# Patient Record
Sex: FEMALE | HISPANIC OR LATINO | URBAN - METROPOLITAN AREA
[De-identification: names, ages, dates, MRNs, and addresses within clinical notes are randomized per-mention and may not be internally consistent; named-entity substitution may affect disease eponyms.]

---

## 2018-09-28 ENCOUNTER — OFFICE VISIT (OUTPATIENT)
Dept: URBAN - METROPOLITAN AREA CLINIC 76 | Facility: CLINIC | Age: 43
End: 2018-09-28

## 2018-09-28 DIAGNOSIS — H25.13 AGE-RELATED NUCLEAR CATARACT, BILATERAL: ICD-10-CM

## 2018-09-28 PROCEDURE — 92014 COMPRE OPH EXAM EST PT 1/>: CPT | Performed by: OPTOMETRIST

## 2018-09-28 ASSESSMENT — VISUAL ACUITY
OD: 20/20
OS: 20/20

## 2018-09-28 ASSESSMENT — KERATOMETRY
OD: 43.75
OS: 44.13

## 2018-09-28 ASSESSMENT — INTRAOCULAR PRESSURE
OD: 16
OS: 16

## 2018-09-28 NOTE — IMPRESSION/PLAN
Impression: Age-related nuclear cataract, bilateral: H25.13. OU. mild early Plan: Discussed condition. Continue to monitor without treatment at this time.

## 2018-09-28 NOTE — IMPRESSION/PLAN
Impression: Hypermetropia, bilateral: H52.03. Plan: Discussed diagnosis with patient. Dispensed new MRx today.

## 2019-08-19 ENCOUNTER — OFFICE VISIT (OUTPATIENT)
Dept: URBAN - METROPOLITAN AREA CLINIC 76 | Facility: CLINIC | Age: 44
End: 2019-08-19
Payer: COMMERCIAL

## 2019-08-19 DIAGNOSIS — H10.45 OTHER CHRONIC ALLERGIC CONJUNCTIVITIS: ICD-10-CM

## 2019-08-19 PROCEDURE — 92012 INTRM OPH EXAM EST PATIENT: CPT | Performed by: OPTOMETRIST

## 2019-08-19 ASSESSMENT — INTRAOCULAR PRESSURE
OD: 15
OS: 14

## 2019-08-19 ASSESSMENT — KERATOMETRY
OD: 44.13
OS: 43.88

## 2019-08-19 NOTE — IMPRESSION/PLAN
Impression: Other chronic allergic conjunctivitis: H10.45. OU. Instilled 1 drop Pazeo OU in office. Plan: Discussed diagnosis with patient. Recommend OTC oral antihistamine, and Ketotifen 1 drop bid ou, prn. Rub excess into lids. Recommend refrigerating drops.

## 2019-08-19 NOTE — IMPRESSION/PLAN
Impression: Amyloid pterygium of left eye: H11.012. OS. Plan: Discussed condition. Use artificial tears for comfort.

## 2020-09-25 ENCOUNTER — OFFICE VISIT (OUTPATIENT)
Dept: URBAN - METROPOLITAN AREA CLINIC 76 | Facility: CLINIC | Age: 45
End: 2020-09-25

## 2020-09-25 DIAGNOSIS — H11.012 AMYLOID PTERYGIUM OF LEFT EYE: ICD-10-CM

## 2020-09-25 PROCEDURE — 92014 COMPRE OPH EXAM EST PT 1/>: CPT | Performed by: OPTOMETRIST

## 2020-09-25 ASSESSMENT — INTRAOCULAR PRESSURE
OD: 18
OS: 18

## 2020-09-25 ASSESSMENT — VISUAL ACUITY
OS: 20/20
OD: 20/20

## 2021-05-12 NOTE — IMPRESSION/PLAN
Impression: Age-related nuclear cataract, bilateral: H25.13. OU. Plan: Discussed condition. Continue to monitor without treatment at this time. Detail Level: Detailed

## 2021-12-06 ENCOUNTER — OFFICE VISIT (OUTPATIENT)
Dept: URBAN - METROPOLITAN AREA CLINIC 76 | Facility: CLINIC | Age: 46
End: 2021-12-06

## 2021-12-06 DIAGNOSIS — H52.03 HYPERMETROPIA, BILATERAL: Primary | ICD-10-CM

## 2021-12-06 PROCEDURE — 92014 COMPRE OPH EXAM EST PT 1/>: CPT | Performed by: OPTOMETRIST

## 2021-12-06 ASSESSMENT — VISUAL ACUITY
OD: 20/20
OS: 20/20

## 2021-12-06 ASSESSMENT — INTRAOCULAR PRESSURE
OD: 17
OS: 18

## 2021-12-06 ASSESSMENT — KERATOMETRY
OD: 43.75
OS: 43.88

## 2021-12-06 NOTE — IMPRESSION/PLAN
Impression: Amyloid pterygium of left eye: H11.012. Left. 2-3 mm on k nasal Plan: Discussed. Pt requesting consult to have Pterygium removed.

## 2022-01-04 ENCOUNTER — OFFICE VISIT (OUTPATIENT)
Dept: URBAN - METROPOLITAN AREA CLINIC 76 | Facility: CLINIC | Age: 47
End: 2022-01-04
Payer: COMMERCIAL

## 2022-01-04 PROCEDURE — 99204 OFFICE O/P NEW MOD 45 MIN: CPT | Performed by: STUDENT IN AN ORGANIZED HEALTH CARE EDUCATION/TRAINING PROGRAM

## 2022-01-04 RX ORDER — NEOMYCIN SULFATE, POLYMYXIN B SULFATE AND DEXAMETHASONE 3.5; 10000; 1 MG/G; [USP'U]/G; MG/G
OINTMENT OPHTHALMIC
Qty: 1 | Refills: 0 | Status: ACTIVE
Start: 2022-01-04

## 2022-01-04 ASSESSMENT — INTRAOCULAR PRESSURE
OD: 18
OS: 18

## 2022-01-04 ASSESSMENT — KERATOMETRY
OS: 43.75
OD: 44.00

## 2022-01-04 NOTE — IMPRESSION/PLAN
Impression: Amyloid pterygium of left eye: H11.012. Plan: Discussed diagnosis and treatment options with patient. Reviewed risks/benefits/alternatives to surgical removal including possible change in refraction, risk of recurrence, mitomycin C, and corneal scarring/risk of corneal melt. Patient wishes to proceed with surgery. Schedule pterygium removal OS.

## 2022-12-23 ENCOUNTER — OFFICE VISIT (OUTPATIENT)
Dept: URBAN - METROPOLITAN AREA CLINIC 76 | Facility: CLINIC | Age: 47
End: 2022-12-23

## 2022-12-23 DIAGNOSIS — H52.03 HYPERMETROPIA, BILATERAL: ICD-10-CM

## 2022-12-23 DIAGNOSIS — H11.012 AMYLOID PTERYGIUM OF LEFT EYE: Primary | ICD-10-CM

## 2022-12-23 PROCEDURE — 92012 INTRM OPH EXAM EST PATIENT: CPT | Performed by: OPTOMETRIST

## 2022-12-23 ASSESSMENT — KERATOMETRY
OS: 43.88
OD: 44.00

## 2022-12-23 ASSESSMENT — INTRAOCULAR PRESSURE
OS: 16
OD: 16

## 2022-12-23 ASSESSMENT — VISUAL ACUITY
OS: 20/20
OD: 20/20

## 2022-12-23 NOTE — IMPRESSION/PLAN
Impression: Amyloid pterygium of left eye: H11.012. Plan: Small pterygium accounts for the patients symptoms. Discussed diagnosis with patient. Systane Balance/Complete or Refresh Relieva 1 drop OU BID-QID. Emphasize importance of UV protection and lubrication. Patient elects to continue without surgery at this time.

## 2023-05-22 ENCOUNTER — OFFICE VISIT (OUTPATIENT)
Dept: URBAN - METROPOLITAN AREA CLINIC 76 | Facility: CLINIC | Age: 48
End: 2023-05-22
Payer: COMMERCIAL

## 2023-05-22 DIAGNOSIS — H11.012 AMYLOID PTERYGIUM OF LEFT EYE: Primary | ICD-10-CM

## 2023-05-22 PROCEDURE — 99213 OFFICE O/P EST LOW 20 MIN: CPT | Performed by: OPTOMETRIST

## 2023-05-22 RX ORDER — PREDNISOLONE ACETATE 10 MG/ML
1 % SUSPENSION/ DROPS OPHTHALMIC
Qty: 5 | Refills: 0 | Status: ACTIVE
Start: 2023-05-22

## 2023-05-22 ASSESSMENT — INTRAOCULAR PRESSURE
OD: 17
OS: 17

## 2023-05-22 NOTE — IMPRESSION/PLAN
Impression: Amyloid pterygium of left eye: H11.012. Plan: Discussed diagnosis with patient. Systane Balance/Complete or Refresh Relieva 1 drop OU BID-QID. Emphasize importance of UV protection and lubrication. Start Prednisolone QID OS followed by a weekly taper. D/c Erythromycin.

## 2023-06-14 ENCOUNTER — OFFICE VISIT (OUTPATIENT)
Dept: URBAN - METROPOLITAN AREA CLINIC 76 | Facility: CLINIC | Age: 48
End: 2023-06-14

## 2023-06-14 DIAGNOSIS — H11.012 AMYLOID PTERYGIUM OF LEFT EYE: Primary | ICD-10-CM

## 2023-06-14 PROCEDURE — 99213 OFFICE O/P EST LOW 20 MIN: CPT | Performed by: OPTOMETRIST

## 2023-06-14 RX ORDER — PREDNISOLONE ACETATE 10 MG/ML
1 % SUSPENSION/ DROPS OPHTHALMIC
Qty: 5 | Refills: 0 | Status: ACTIVE
Start: 2023-06-14

## 2023-06-14 ASSESSMENT — INTRAOCULAR PRESSURE
OD: 17
OS: 17

## 2023-06-14 NOTE — IMPRESSION/PLAN
Impression: Amyloid pterygium of left eye: H11.012.
-Pain resolved
-Improvement with Prednisolone
-IOP Stable OU Plan: Discussed. Systane Balance/Complete or Refresh Relieva 1 drop OU BID-QID. Continue using Prednisolone QD OS x1 week and then d/c.

## 2024-12-20 ENCOUNTER — OFFICE VISIT (OUTPATIENT)
Dept: URBAN - METROPOLITAN AREA CLINIC 76 | Facility: CLINIC | Age: 49
End: 2024-12-20
Payer: COMMERCIAL

## 2024-12-20 DIAGNOSIS — H52.03 HYPERMETROPIA, BILATERAL: Primary | ICD-10-CM

## 2024-12-20 PROCEDURE — 92014 COMPRE OPH EXAM EST PT 1/>: CPT | Performed by: OPTOMETRIST

## 2024-12-20 ASSESSMENT — INTRAOCULAR PRESSURE
OS: 17
OD: 18

## 2024-12-20 ASSESSMENT — VISUAL ACUITY
OS: 20/20
OD: 20/20

## 2024-12-20 ASSESSMENT — KERATOMETRY
OD: 43.88
OS: 43.88